# Patient Record
Sex: MALE | URBAN - METROPOLITAN AREA
[De-identification: names, ages, dates, MRNs, and addresses within clinical notes are randomized per-mention and may not be internally consistent; named-entity substitution may affect disease eponyms.]

---

## 2020-06-17 ENCOUNTER — APPOINTMENT (RX ONLY)
Dept: URBAN - METROPOLITAN AREA CLINIC 12 | Facility: CLINIC | Age: 55
Setting detail: DERMATOLOGY
End: 2020-06-17

## 2020-06-17 DIAGNOSIS — L60.3 NAIL DYSTROPHY: ICD-10-CM

## 2020-06-17 DIAGNOSIS — L82.1 OTHER SEBORRHEIC KERATOSIS: ICD-10-CM

## 2020-06-17 PROCEDURE — ? COUNSELING

## 2020-06-17 PROCEDURE — ? MEDICATION COUNSELING

## 2020-06-17 PROCEDURE — 99202 OFFICE O/P NEW SF 15 MIN: CPT

## 2020-06-17 PROCEDURE — ? DEFER

## 2020-06-17 ASSESSMENT — LOCATION SIMPLE DESCRIPTION DERM
LOCATION SIMPLE: LEFT GREAT TOE
LOCATION SIMPLE: LEFT 3RD TOE
LOCATION SIMPLE: RIGHT GREAT TOE

## 2020-06-17 ASSESSMENT — LOCATION DETAILED DESCRIPTION DERM
LOCATION DETAILED: RIGHT DORSAL GREAT TOE
LOCATION DETAILED: LEFT DISTAL PLANTAR 3RD TOE
LOCATION DETAILED: LEFT DISTAL PLANTAR GREAT TOE

## 2020-06-17 ASSESSMENT — LOCATION ZONE DERM: LOCATION ZONE: TOE

## 2020-06-17 NOTE — PROCEDURE: MEDICATION COUNSELING
Normal for race Xolair Counseling:  Patient informed of potential adverse effects including but not limited to fever, muscle aches, rash and allergic reactions.  The patient verbalized understanding of the proper use and possible adverse effects of Xolair.  All of the patient's questions and concerns were addressed.

## 2020-06-17 NOTE — HPI: NAIL DISCOLORATION (MELANONYCHIA)
Is This A New Presentation, Or A Follow-Up?: Nail Discoloration
How Severe Is It?: mild
Additional History: \\n\\nPt has discolored toe nails. Has been that way for years.no itching on feet.

## 2020-06-17 NOTE — HPI: SKIN LESION
What Type Of Note Output Would You Prefer (Optional)?: Bullet Format
How Severe Is Your Skin Lesion?: mild
Has Your Skin Lesion Been Treated?: not been treated
Is This A New Presentation, Or A Follow-Up?: Skin Lesion
Additional History: \\n\\nPt states lesion is dry, otherwise ASX

## 2020-06-17 NOTE — PROCEDURE: MEDICATION COUNSELING
Xeljohnathanz Pregnancy And Lactation Text: This medication is Pregnancy Category D and is not considered safe during pregnancy.  The risk during breast feeding is also uncertain.

## 2020-07-27 ENCOUNTER — APPOINTMENT (RX ONLY)
Dept: URBAN - METROPOLITAN AREA CLINIC 12 | Facility: CLINIC | Age: 55
Setting detail: DERMATOLOGY
End: 2020-07-27

## 2020-07-27 DIAGNOSIS — L60.3 NAIL DYSTROPHY: ICD-10-CM

## 2020-07-27 PROCEDURE — ? COUNSELING

## 2020-07-27 PROCEDURE — ? MEDICATION COUNSELING

## 2020-07-27 PROCEDURE — ? ORDER TESTS

## 2020-07-27 PROCEDURE — ? PRESCRIPTION

## 2020-07-27 PROCEDURE — ? DIAGNOSIS COMMENT

## 2020-07-27 NOTE — PROCEDURE: DIAGNOSIS COMMENT
Comment: 6/17/20 CBC wnl; CMP with slight elevated ALT (48).  \\n- Ok to start sporanox therapy pulse dosing.\\n- recheck cbc and cmp in 6 weeks. \\n\\nsporanox 200 mg cap po bid x 1 week per month for a total of 3 months. take with food.\\nside effects may include: liver irritation, rash, nausea, diarrhea, headache\\n

## 2020-07-27 NOTE — PROCEDURE: MEDICATION COUNSELING
denies pain/discomfort Fluconazole Counseling:  Patient counseled regarding adverse effects of fluconazole including but not limited to headache, diarrhea, nausea, upset stomach, liver function test abnormalities, taste disturbance, and stomach pain.  There is a rare possibility of liver failure that can occur when taking fluconazole.  The patient understands that monitoring of LFTs and kidney function test may be required, especially at baseline. The patient verbalized understanding of the proper use and possible adverse effects of fluconazole.  All of the patient's questions and concerns were addressed.

## 2020-07-29 RX ORDER — ITRACONAZOLE 100 MG/1
CAPSULE ORAL
Qty: 28 | Refills: 2 | Status: ERX | COMMUNITY
Start: 2020-07-29

## 2020-07-29 RX ADMIN — ITRACONAZOLE: 100 CAPSULE ORAL at 00:00

## 2021-01-15 NOTE — PROCEDURE: MEDICATION COUNSELING
n/a Cephalexin Pregnancy And Lactation Text: This medication is Pregnancy Category B and considered safe during pregnancy.  It is also excreted in breast milk but can be used safely for shorter doses.

## 2021-05-28 NOTE — PROCEDURE: MEDICATION COUNSELING
Statement Selected Topical Sulfur Applications Pregnancy And Lactation Text: This medication is Pregnancy Category C and has an unknown safety profile during pregnancy. It is unknown if this topical medication is excreted in breast milk.

## 2022-02-07 ENCOUNTER — APPOINTMENT (RX ONLY)
Dept: URBAN - METROPOLITAN AREA CLINIC 12 | Facility: CLINIC | Age: 57
Setting detail: DERMATOLOGY
End: 2022-02-07

## 2022-02-07 DIAGNOSIS — L60.3 NAIL DYSTROPHY: ICD-10-CM

## 2022-02-07 PROCEDURE — ? PRESCRIPTION MEDICATION MANAGEMENT

## 2022-02-07 PROCEDURE — ? DIAGNOSIS COMMENT

## 2022-02-07 PROCEDURE — ? ORDER TESTS

## 2022-02-07 PROCEDURE — 99213 OFFICE O/P EST LOW 20 MIN: CPT

## 2022-02-07 PROCEDURE — ? MEDICATION COUNSELING

## 2022-02-07 PROCEDURE — ? COUNSELING

## 2022-02-07 NOTE — PROCEDURE: MIPS QUALITY
Quality 130: Documentation Of Current Medications In The Medical Record: Current Medications Documented
Quality 431: Preventive Care And Screening: Unhealthy Alcohol Use - Screening: Patient screened for unhealthy alcohol use using a single question and scores less than 2 times per year
Quality 226: Preventive Care And Screening: Tobacco Use: Screening And Cessation Intervention: Patient screened for tobacco use and is an ex/non-smoker
Detail Level: Detailed
Quality 431: Preventive Care And Screening: Unhealthy Alcohol Use - Screening: Patient not identified as an unhealthy alcohol user when screened for unhealthy alcohol use using a systematic screening method

## 2022-02-07 NOTE — PROCEDURE: PRESCRIPTION MEDICATION MANAGEMENT
Render In Strict Bullet Format?: No
Plan: NAILS cleared 30% initially with 3 months of sporonox pulse dosing then started to reinfect.  \\nRepeat cbc, CMP at this time. \\nPlan to resume sporonox pending labs.  We will repeat labs in 6 weeks and see pt back in 3 months. We may need to continue pulse therapy up to 6 months.
Detail Level: Zone

## 2022-02-07 NOTE — PROCEDURE: DIAGNOSIS COMMENT
Comment: - 7/21/2020 nail culture = aspergillus; plan on sporanox. NKDA, no\\nmeds.reviewed results with pt.\\nHe states he had labs done at  the day of his OV here. Will contact  for recent cbc and cmp. If\\nnormal, will call pt and send script to [facility].\\nWill mail pt script to repeat labs in 6 weeks.6/17/20 CBC wnl; CMP with slight elevated ALT (48).\\n- Ok to start sporanox therapy pulse dosing.\\n- recheck cbc and cmp in 6 weeks.\\nsporanox 200 mg cap po bid x 1 week per month for a total of 3 months. take with food.\\nside effects may include: liver irritation, rash, nausea, diarrhea, headache Comment: - 7/21/2020 nail culture = aspergillus; plan on sporanox. NKDA, no\\nmeds.reviewed results with pt.\\nHe states he had labs done at  the day of his OV here. Will contact  for recent cbc and cmp. If\\nnormal, will call pt and send script to [facility].\\nWill mail pt script to repeat labs in 6 weeks.6/17/20 CBC wnl; CMP with slight elevated ALT (48).\\n- Ok to start sporanox therapy pulse dosing.\\n- recheck cbc and cmp in 6 weeks.\\nsporanox 200 mg cap po bid x 1 week per month for a total of 3 months. take with food.\\nside effects may include: liver irritation, rash, nausea, diarrhea, headache

## 2022-02-08 ENCOUNTER — RX ONLY (OUTPATIENT)
Age: 57
Setting detail: RX ONLY
End: 2022-02-08

## 2022-02-08 RX ORDER — ITRACONAZOLE 100 MG/1
CAPSULE ORAL
Qty: 28 | Refills: 2 | Status: ERX | COMMUNITY
Start: 2022-02-08

## 2025-03-25 NOTE — PROCEDURE: MEDICATION COUNSELING
normal Fluconazole Counseling:  Patient counseled regarding adverse effects of fluconazole including but not limited to headache, diarrhea, nausea, upset stomach, liver function test abnormalities, taste disturbance, and stomach pain.  There is a rare possibility of liver failure that can occur when taking fluconazole.  The patient understands that monitoring of LFTs and kidney function test may be required, especially at baseline. The patient verbalized understanding of the proper use and possible adverse effects of fluconazole.  All of the patient's questions and concerns were addressed.